# Patient Record
Sex: FEMALE | Race: WHITE | URBAN - METROPOLITAN AREA
[De-identification: names, ages, dates, MRNs, and addresses within clinical notes are randomized per-mention and may not be internally consistent; named-entity substitution may affect disease eponyms.]

---

## 2023-05-04 ENCOUNTER — LAB OUTSIDE AN ENCOUNTER (OUTPATIENT)
Dept: URBAN - METROPOLITAN AREA CLINIC 19 | Facility: CLINIC | Age: 23
End: 2023-05-04

## 2023-05-04 ENCOUNTER — WEB ENCOUNTER (OUTPATIENT)
Dept: URBAN - METROPOLITAN AREA CLINIC 19 | Facility: CLINIC | Age: 23
End: 2023-05-04

## 2023-05-04 ENCOUNTER — OFFICE VISIT (OUTPATIENT)
Dept: URBAN - METROPOLITAN AREA CLINIC 19 | Facility: CLINIC | Age: 23
End: 2023-05-04
Payer: SELF-PAY

## 2023-05-04 VITALS
SYSTOLIC BLOOD PRESSURE: 120 MMHG | TEMPERATURE: 97.4 F | HEIGHT: 66 IN | HEART RATE: 76 BPM | WEIGHT: 120.6 LBS | BODY MASS INDEX: 19.38 KG/M2 | DIASTOLIC BLOOD PRESSURE: 78 MMHG

## 2023-05-04 DIAGNOSIS — R11.14 BILIOUS VOMITING WITH NAUSEA: ICD-10-CM

## 2023-05-04 DIAGNOSIS — R19.7 DIARRHEA, UNSPECIFIED TYPE: ICD-10-CM

## 2023-05-04 DIAGNOSIS — R10.84 GENERALIZED ABDOMINAL PAIN: ICD-10-CM

## 2023-05-04 PROCEDURE — 99204 OFFICE O/P NEW MOD 45 MIN: CPT | Performed by: INTERNAL MEDICINE

## 2023-05-04 RX ORDER — ONDANSETRON 4 MG/1
1 TABLET ON THE TONGUE AND ALLOW TO DISSOLVE TABLET, ORALLY DISINTEGRATING ORAL TWICE A DAY
Qty: 60 TABLET | Refills: 0 | OUTPATIENT
Start: 2023-05-04

## 2023-05-04 RX ORDER — HYOSCYAMINE SULFATE 0.12 MG/1
1 TABLET UNDER THE TONGUE AND ALLOW TO DISSOLVE AS NEEDED TABLET SUBLINGUAL THREE TIMES A DAY
Qty: 60 | Refills: 0 | OUTPATIENT
Start: 2023-05-04 | End: 2023-06-03

## 2023-05-04 NOTE — HPI-ZZZTODAY'S VISIT
23-year-old female presents to the office for hospital follow-up.  She was in the emergency room 4/28/2023 for abdominal cramping and diarrhea for 1 week.  Labs CBC normal, CMP with creatinine slightly elevated at 1.3, AST slightly elevated at 50.  She was placed on dicyclomine and ibuprofen. Presented back to the emergency room 5/1/2023 for continued abdominal pain, nausea and vomiting, diarrhea.  Labs CBC normal, CMP with AST of 55, CT abdomen and pelvis showed possible pyelonephritis, trace pelvic ascites. She complains of abdominal pain, all over, along with nausea and diarrhea for 2 weeks. Pain was cramping/spasms, diffuse, has improved some. Her Nausea/vomiting is better but is taking Zofran every 8 hrs. She has not had much of an appeite, wt loss of 11lbs over 2 weeks.  Diarrhea continues, having 4-5 times a day, watery loose, with nocturnal symptoms. Denies blood in stools.  Has had similar symptoms previously, but not this severe, does have anxiety.  Denies fever but has had chills, has had some lesions in her mouth, denies skin rash/joint swelling. Denies family hx of CRC, IBD.  Has not had any stool studies. Never had EGD/Colonoscopy  PLAN stool studies/labs, EGD/Colon to eval for IBD

## 2023-05-04 NOTE — PHYSICAL EXAM GASTROINTESTINAL
Abdomen, soft, tenderness mid abdomen, nondistended, normal bowel sounds, Liver and Spleen, no hepatomegaly present

## 2023-05-09 ENCOUNTER — TELEPHONE ENCOUNTER (OUTPATIENT)
Dept: URBAN - METROPOLITAN AREA CLINIC 19 | Facility: CLINIC | Age: 23
End: 2023-05-09

## 2023-05-09 LAB
ADENOVIRUS F 40/41: NOT DETECTED
CALPROTECTIN, STOOL - QDX: (no result)
CAMPYLOBACTER: NOT DETECTED
CLOSTRIDIUM DIFFICILE: NOT DETECTED
CRYPTOSPORIDIUM: NOT DETECTED
ENTAMOEBA HISTOLYTICA: NOT DETECTED
ENTEROAGGREGATIVE E.COLI: NOT DETECTED
ENTEROTOXIGENIC E.COLI: NOT DETECTED
ESCHERICHIA COLI O157: NOT DETECTED
GIARDIA LAMBLIA: NOT DETECTED
H. PYLORI (EIA) - QDX: NEGATIVE
NOROVIRUS GI/GII: NOT DETECTED
ROTAVIRUS A: NOT DETECTED
SALMONELLA SPP.: NOT DETECTED
SHIGA-LIKE TOXIN PRODUCING E.COLI: NOT DETECTED
SHIGELLA SPP. / ENTEROINVASIVE E.COLI: NOT DETECTED
VIBRIO PARAHAEMOLYTICUS: NOT DETECTED
VIBRIO SPP.: NOT DETECTED
YERSINIA ENTEROCOLITICA: NOT DETECTED

## 2023-06-16 ENCOUNTER — OFFICE VISIT (OUTPATIENT)
Dept: URBAN - METROPOLITAN AREA CLINIC 19 | Facility: CLINIC | Age: 23
End: 2023-06-16

## 2023-06-16 ENCOUNTER — DASHBOARD ENCOUNTERS (OUTPATIENT)
Age: 23
End: 2023-06-16

## 2023-06-16 VITALS — HEIGHT: 66 IN

## 2023-06-16 RX ORDER — ONDANSETRON 4 MG/1
1 TABLET ON THE TONGUE AND ALLOW TO DISSOLVE TABLET, ORALLY DISINTEGRATING ORAL TWICE A DAY
Qty: 60 TABLET | Refills: 0 | Status: ACTIVE | COMMUNITY
Start: 2023-05-04

## 2023-06-16 NOTE — HPI-ZZZTODAY'S VISIT
23-year-old female presents to the office for hospital follow-up.  She was in the emergency room 4/28/2023 for abdominal cramping and diarrhea for 1 week.  Labs CBC normal, CMP with creatinine slightly elevated at 1.3, AST slightly elevated at 50.  She was placed on dicyclomine and ibuprofen. Presented back to the emergency room 5/1/2023 for continued abdominal pain, nausea and vomiting, diarrhea.  Labs CBC normal, CMP with AST of 55, CT abdomen and pelvis showed possible pyelonephritis, trace pelvic ascites. She complains of abdominal pain, all over, along with nausea and diarrhea for 2 weeks. Pain was cramping/spasms, diffuse, has improved some. Her Nausea/vomiting is better but is taking Zofran every 8 hrs. She has not had much of an appeite, wt loss of 11lbs over 2 weeks.  Diarrhea continues, having 4-5 times a day, watery loose, with nocturnal symptoms. Denies blood in stools.  Has had similar symptoms previously, but not this severe, does have anxiety.  Denies fever but has had chills, has had some lesions in her mouth, denies skin rash/joint swelling. Denies family hx of CRC, IBD.  Has not had any stool studies. Never had EGD/Colonoscopy  PLAN stool studies/labs, EGD/Colon to eval for IBD 5/4/2023 stool studies, Zofran, colonoscopy with biopsies, Levsin/IBgard, EGD Labs GI PCR negative, calprotectin borderline at 53, H. pylori stool negative.

## 2023-06-22 ENCOUNTER — OFFICE VISIT (OUTPATIENT)
Dept: URBAN - METROPOLITAN AREA CLINIC 19 | Facility: CLINIC | Age: 23
End: 2023-06-22